# Patient Record
(demographics unavailable — no encounter records)

---

## 2025-04-07 NOTE — HISTORY OF PRESENT ILLNESS
[Yes] : Patient goes to dentist yearly [Tap water] : Primary Fluoride Source: Tap water [Up to date] : Up to date [FreeTextEntry1] : VUTD No medical issues No meds No allergies.  No PSH No hsopitalizations  School: [employer] 9th grade Activities: swim   Diet: B: deli L: eats after school;  D: protein, vegetables, fruits Milk: 1-5 cups per day  Stooling regularly  Sleep: 11:30p - 6:30a  H: Lives with mom, 4 siblings, dad (lives in [place]); feels safe E: Feels safe A D: vaping 1x; no smoking; no alcohol. No drugs S: interested in girls; not sexually active S: good mood; no thoughts of SI.

## 2025-04-07 NOTE — HISTORY OF PRESENT ILLNESS
[Yes] : Patient goes to dentist yearly [Tap water] : Primary Fluoride Source: Tap water [Up to date] : Up to date [FreeTextEntry1] : VUTD No medical issues No meds No allergies.  No PSH No hsopitalizations  School: [employer] 9th grade Activities: swim   Diet: B: deli L: eats after school;  D: protein, vegetables, fruits Milk: 1-5 cups per day  Stooling regularly  Sleep: 11:30p - 6:30a  H: Lives with mom, 4 siblings, dad (lives in [place]); feels safe E: Feels safe A D: vaping 1x; no smoking; no alcohol. No drugs S: interested in girls; not sexually active S: good mood; no thoughts of SI.